# Patient Record
Sex: FEMALE | Race: WHITE | Employment: FULL TIME | URBAN - NONMETROPOLITAN AREA
[De-identification: names, ages, dates, MRNs, and addresses within clinical notes are randomized per-mention and may not be internally consistent; named-entity substitution may affect disease eponyms.]

---

## 2020-08-15 ENCOUNTER — HOSPITAL ENCOUNTER (EMERGENCY)
Facility: OTHER | Age: 60
Discharge: HOME | End: 2020-08-15
Attending: PHYSICIAN ASSISTANT | Admitting: PHYSICIAN ASSISTANT
Payer: COMMERCIAL

## 2020-08-15 VITALS
RESPIRATION RATE: 20 BRPM | SYSTOLIC BLOOD PRESSURE: 170 MMHG | OXYGEN SATURATION: 99 % | DIASTOLIC BLOOD PRESSURE: 80 MMHG | HEIGHT: 61 IN | TEMPERATURE: 98.4 F | WEIGHT: 185 LBS | HEART RATE: 80 BPM | BODY MASS INDEX: 34.93 KG/M2

## 2020-08-15 DIAGNOSIS — R04.0 EPISTAXIS: ICD-10-CM

## 2020-08-15 PROCEDURE — 30901 CONTROL OF NOSEBLEED: CPT | Mod: Z6 | Performed by: PHYSICIAN ASSISTANT

## 2020-08-15 PROCEDURE — 99282 EMERGENCY DEPT VISIT SF MDM: CPT | Mod: Z6 | Performed by: PHYSICIAN ASSISTANT

## 2020-08-15 PROCEDURE — 99282 EMERGENCY DEPT VISIT SF MDM: CPT | Mod: 25 | Performed by: PHYSICIAN ASSISTANT

## 2020-08-15 PROCEDURE — 30901 CONTROL OF NOSEBLEED: CPT | Performed by: PHYSICIAN ASSISTANT

## 2020-08-15 RX ORDER — TRANEXAMIC ACID 100 MG/ML
INJECTION, SOLUTION INTRAVENOUS ONCE
Status: DISCONTINUED | OUTPATIENT
Start: 2020-08-15 | End: 2020-08-15 | Stop reason: HOSPADM

## 2020-08-15 ASSESSMENT — MIFFLIN-ST. JEOR: SCORE: 1351.53

## 2020-08-15 NOTE — DISCHARGE INSTRUCTIONS
FOLLOW UP WITH YOUR DOCTOR IN THE NEXT 24 HOURS FOR A RECHECK  RETURN TO THE ER IF SYMPTOMS WORSEN OR IF YOU HAVE FURTHER CONCERNS   TAKE ALL PREVIOUS AND ANY NEW MEDS AS PRESCRIBED       THE DISCHARGE INSTRUCTIONS ARE INTENDED AS A COMPLEMENT TO AND NOT A REPLACEMENT FOR THE VERBAL INSTRUCTIONS THAT I HAVE PROVIDED YOU TONIGHT. AFTER GOING OVER THE PLAN OF CARE TONIGHT, INCLUDING SIDE EFFECTS, ADVERSE REACTIONS OF ALL MEDICATIONS PRESCRIBED (THIS WILL BE PROVIDED TO YOU AT THE PHARMACY ALSO) AND PROVIDING YOU WITH THE VERBAL INSTRUCTIONS AT DISCHARGE YOU HAVE HAD THE OPPORTUNITY TO ASK FURTHER QUESTIONS AND TO CLARIFY UNCERTAINTIES. SINCE YOU HAVE NO FURTHER QUESTIONS PLEASE HAVE A WONDERFUL SAFE EVENING THANK YOU.              EXCUSE FROM WORK DUE TO ILLNESS      Excuse from work due to Illness. Seen in clinic August 15, 2020.  Return to work when well.

## 2020-08-15 NOTE — ED PROVIDER NOTES
"  History     Chief Complaint   Patient presents with     Epistaxis     HPI  Cira Burciaga is a 60 year old female who presents to the emergency room this evening for evaluation of left nare epistaxis is been going on for several hours.  She is had previous epistaxis is.  She is afebrile she does not have any headaches adverse terms was no cough chest pain shortness of breath no abdominal pain are constipation no loss of bowel bladder function rashes or trauma does not use any CPAP or BiPAP at home.  She is mildly hypertensive.    Allergies:  Allergies   Allergen Reactions     Sulfa Drugs Swelling     Eye swelling       Problem List:    There are no active problems to display for this patient.       Past Medical History:    No past medical history on file.    Past Surgical History:    No past surgical history on file.    Family History:    No family history on file.    Social History:  Marital Status:   [4]  Social History     Tobacco Use     Smoking status: Not on file   Substance Use Topics     Alcohol use: Not on file     Drug use: Not on file        Medications:    No current outpatient medications on file.        Review of Systems     Pertinent positives and negatives are as above in the HPI. 10 point review of systems is otherwise negative.      Physical Exam   BP: (!) 188/99  Heart Rate: 84  Temp: 98.3  F (36.8  C)  Resp: 20  Height: 154.9 cm (5' 1\")  Weight: 83.9 kg (185 lb)  SpO2: 100 %  Vitals:    08/15/20 1430   BP: (!) 188/99   Resp: 20   Temp: 98.3  F (36.8  C)   TempSrc: Tympanic   SpO2: 100%   Weight: 83.9 kg (185 lb)   Height: 1.549 m (5' 1\")         Physical Exam  Exam:  Constitutional: healthy, alert and no distress  Head: Normocephalic. No masses, lesions, tenderness or abnormalities  Neck: Neck supple. No adenopathy. Thyroid symmetric, normal size,, Carotids without bruits.  ENT: Left nare anterior bleed noted, posterior oropharynx does have some bleeding most notably probably from the " left nare.  Right nares otherwise unremarkable  Cardiovascular: negative, PMI normal. No lifts, heaves, or thrills. RRR. No murmurs, clicks gallops or rub  Respiratory: negative, Percussion normal. Good diaphragmatic excursion. Lungs clear  Gastrointestinal: Abdomen soft, non-tender. BS normal. No masses, organomegaly  : Deferred  Musculoskeletal: extremities normal- no gross deformities noted, gait normal and normal muscle tone  Skin: no suspicious lesions or rashes  Neurologic: Gait normal. Reflexes normal and symmetric. Sensation grossly WNL.  Psychiatric: mentation appears normal and affect normal/bright  Hematologic/Lymphatic/Immunologic: Normal cervical lymph nodes    ED Course        [facility]    -Epistaxis Mgmt    Date/Time: 8/15/2020 3:12 PM  Performed by: Tito Yoder PA-C  Authorized by: Tito Yoder PA-C       ANESTHESIA (see MAR for exact dosages)     Anesthesia method:  Topical application    Topical anesthesia: Lidocaine with epinephrine, Afrin nasal spray and TXA was instilled into the left nare.      PROCEDURE DETAILS     Treatment site:  L anterior    Treatment method:  Silver nitrate    Treatment complexity:  Extensive    Treatment episode: no recurrence of recent bleed        POST PROCEDURE     Assessment:  Bleeding stopped  PROCEDURE   Patient Tolerance:  Patient tolerated the procedure well with no immediate complications                  No results found for this or any previous visit (from the past 24 hour(s)).    Medications   tranexamic acid (CYKLOKAPRON) TOPICAL (injection used topically) (has no administration in time range)       Assessments & Plan (with Medical Decision Making)     I have reviewed the nursing notes.    I have reviewed the findings, diagnosis, plan and need for follow up with the patient.  The patient who presents for evaluation of a left nare epistaxis.  Did have extensive silver nitrate cauterization of the left nare.  We  achieved hemostasis.  Posterior oropharynx there was no bleeding.  I would encourage her close follow-up with ear nose and throat.  She should also see her primary care physician to discuss antihypertensives. As her BP was elevated.   I explained my diagnostic considerations and recommendations and the patient voiced an understanding and was in agreement with the treatment plan. All questions were answered. We discussed potential side effects of any prescribed or recommended therapies, as well as expectations for response to treatments.    New Prescriptions    No medications on file       Final diagnoses:   Epistaxis       8/15/2020   [facility]     Tito Yoder PA-C  08/15/20 1526

## 2020-08-15 NOTE — ED TRIAGE NOTES
Patient complaining of nose bleed that started about 20 minutes ago.  Patient stated she has had nose bleeds in the past but never this bad. Clamp placed and patient brought to [facility].

## 2020-08-15 NOTE — ED AVS SNAPSHOT
[facility]  [address]  [place] [address]  Phone:  [phone]  Fax:  [phone]                                    Cira Burciaga   MRN: [id number]    Department:  [facility]   Date of Visit:  8/15/2020           After Visit Summary Signature Page    I have received my discharge instructions, and my questions have been answered. I have discussed any challenges I see with this plan with the nurse or doctor.    ..........................................................................................................................................  Patient/Patient Representative Signature      ..........................................................................................................................................  Patient Representative Print Name and Relationship to Patient    ..................................................               ................................................  Date                                   Time    ..........................................................................................................................................  Reviewed by Signature/Title    ...................................................              ..............................................  Date                                               Time          22EPIC Rev 08/18

## (undated) RX ORDER — OXYMETAZOLINE HYDROCHLORIDE 0.05 G/100ML
SPRAY NASAL
Status: DISPENSED
Start: 2020-08-15

## (undated) RX ORDER — LIDOCAINE HCL/EPINEPHRINE/PF 2%-1:200K
VIAL (ML) INJECTION
Status: DISPENSED
Start: 2020-08-15